# Patient Record
Sex: MALE | Race: WHITE | NOT HISPANIC OR LATINO | URBAN - METROPOLITAN AREA
[De-identification: names, ages, dates, MRNs, and addresses within clinical notes are randomized per-mention and may not be internally consistent; named-entity substitution may affect disease eponyms.]

---

## 2022-05-20 ENCOUNTER — WEB ENCOUNTER (OUTPATIENT)
Dept: URBAN - METROPOLITAN AREA CLINIC 107 | Facility: CLINIC | Age: 42
End: 2022-05-20

## 2022-05-20 ENCOUNTER — OFFICE VISIT (OUTPATIENT)
Dept: URBAN - METROPOLITAN AREA CLINIC 107 | Facility: CLINIC | Age: 42
End: 2022-05-20
Payer: SELF-PAY

## 2022-05-20 VITALS
HEIGHT: 73 IN | RESPIRATION RATE: 20 BRPM | DIASTOLIC BLOOD PRESSURE: 98 MMHG | WEIGHT: 301 LBS | HEART RATE: 75 BPM | TEMPERATURE: 98.6 F | SYSTOLIC BLOOD PRESSURE: 130 MMHG | BODY MASS INDEX: 39.89 KG/M2

## 2022-05-20 DIAGNOSIS — K62.5 BRIGHT RED BLOOD PER RECTUM: ICD-10-CM

## 2022-05-20 DIAGNOSIS — R74.8 ELEVATED LIVER ENZYMES: ICD-10-CM

## 2022-05-20 PROCEDURE — 99204 OFFICE O/P NEW MOD 45 MIN: CPT | Performed by: INTERNAL MEDICINE

## 2022-05-20 PROCEDURE — 99244 OFF/OP CNSLTJ NEW/EST MOD 40: CPT | Performed by: INTERNAL MEDICINE

## 2022-05-20 RX ORDER — DULOXETINE HYDROCHLORIDE 60 MG/1
1 CAPSULE CAPSULE, DELAYED RELEASE ORAL ONCE A DAY
Status: ACTIVE | COMMUNITY

## 2022-05-20 NOTE — HPI-TODAY'S VISIT:
42-year-old male referred by Dr. Tony Bui for evaluation of steatosis of the liver.  A copy of this document is being forwarded to the referring provider.  Recent labs with his PCP showed elevated liver enzymes, outlined below. He had an abdominal ultrasound about a week later at [facility] which reportedly showed fatty liver. He is going fairly well from a GI standpoint, aside from intermittent red blood per rectum which has been ongoing for some time. Every few months, he will experience bright red blood with a bowel movement which fills the toilet bowl. This is worsened with straining to have a bowel movement or following consumption of spicy foods. The bleeding may last for several days. He typically has 1-2 bowel movements daily. He denies abdominal pain, nausea or vomiting. He reports infrequent NSAID use and denies alcohol or tobacco use.  Labs 3/30/2022:Negative hepatitis A antibody IgM and total, hepatitis B surface antigen and antibody, hepatitis B E antigen and antibody, hepatitis B core antibody IgM and total, hepatitis C antibody.  Normal GGT, 23.  PT/INR 9.8/0.9.  Normal ferritin, 140.  AST 41, ALT 74, ALP 84, T bili 0.4.  CBC unremarkable with hemoglobin 15.4, platelets 242.

## 2022-05-31 PROBLEM — 405729008 BRIGHT RED BLOOD PER RECTUM: Status: ACTIVE | Noted: 2022-05-31

## 2022-07-12 ENCOUNTER — OFFICE VISIT (OUTPATIENT)
Dept: URBAN - METROPOLITAN AREA SURGERY CENTER 25 | Facility: SURGERY CENTER | Age: 42
End: 2022-07-12
Payer: SELF-PAY

## 2022-07-12 ENCOUNTER — TELEPHONE ENCOUNTER (OUTPATIENT)
Dept: URBAN - METROPOLITAN AREA CLINIC 113 | Facility: CLINIC | Age: 42
End: 2022-07-12

## 2022-07-12 DIAGNOSIS — K92.1 MELENA: ICD-10-CM

## 2022-07-12 DIAGNOSIS — D12.8 TUBULAR ADENOMA OF RECTUM: ICD-10-CM

## 2022-07-12 DIAGNOSIS — K64.8 HEMORRHOIDS, INTERNAL: ICD-10-CM

## 2022-07-12 DIAGNOSIS — D12.2 ADENOMA OF ASCENDING COLON: ICD-10-CM

## 2022-07-12 PROCEDURE — G8907 PT DOC NO EVENTS ON DISCHARG: HCPCS | Performed by: INTERNAL MEDICINE

## 2022-07-12 PROCEDURE — 45385 COLONOSCOPY W/LESION REMOVAL: CPT | Performed by: INTERNAL MEDICINE

## 2022-07-12 RX ORDER — DULOXETINE HYDROCHLORIDE 60 MG/1
1 CAPSULE CAPSULE, DELAYED RELEASE ORAL ONCE A DAY
Status: ACTIVE | COMMUNITY

## 2022-08-02 ENCOUNTER — OFFICE VISIT (OUTPATIENT)
Dept: URBAN - METROPOLITAN AREA CLINIC 107 | Facility: CLINIC | Age: 42
End: 2022-08-02
Payer: SELF-PAY

## 2022-08-02 VITALS
BODY MASS INDEX: 40.82 KG/M2 | DIASTOLIC BLOOD PRESSURE: 78 MMHG | HEART RATE: 66 BPM | WEIGHT: 308 LBS | TEMPERATURE: 98.5 F | SYSTOLIC BLOOD PRESSURE: 114 MMHG | HEIGHT: 73 IN

## 2022-08-02 DIAGNOSIS — K76.0 HEPATIC STEATOSIS: ICD-10-CM

## 2022-08-02 DIAGNOSIS — D36.9 TUBULAR ADENOMA: ICD-10-CM

## 2022-08-02 DIAGNOSIS — R74.8 ELEVATED LIVER ENZYMES: ICD-10-CM

## 2022-08-02 DIAGNOSIS — K64.8 INTERNAL HEMORRHOIDS WITH COMPLICATION: ICD-10-CM

## 2022-08-02 PROCEDURE — 99214 OFFICE O/P EST MOD 30 MIN: CPT | Performed by: NURSE PRACTITIONER

## 2022-08-02 RX ORDER — DULOXETINE HYDROCHLORIDE 60 MG/1
1 CAPSULE CAPSULE, DELAYED RELEASE ORAL ONCE A DAY
Status: ON HOLD | COMMUNITY

## 2022-08-02 NOTE — HPI-OTHER HISTORIES
Labs 3/30/2022:Negative hepatitis A antibody IgM and total, hepatitis B surface antigen and antibody, hepatitis B E antigen and antibody, hepatitis B core antibody IgM and total, hepatitis C antibody.  Normal GGT, 23.  PT/INR 9.8/0.9.  Normal ferritin, 140.  AST 41, ALT 74, ALP 84, T bili 0.4.  CBC unremarkable with hemoglobin 15.4, platelets 242.

## 2022-08-02 NOTE — HPI-TODAY'S VISIT:
42-year-old male presenting for follow-up after colonoscopy. He was seen in the office in May for evaluation of elevated liver enzymes, suspected to be related to nonalcoholic fatty liver disease.  Recent labs were negative for viral or hereditary cause of liver disease and ultrasound was reportedly unremarkable aside from hepatic steatosis.  Results were requested for review.  Labs were planned to trend his liver enzymes and exclude autoimmune etiology.  He was encouraged efforts at weight reduction with low-fat diet and exercise.  Regarding intermittent small-volume red blood per rectum, suspected to be hemorrhoidal in origin, a diagnostic colonoscopy was planned. Labs 7/27/2022:AST 40, ALT 70, ALP 77, T bili 0.3.  Ferritin 95.4.  Negative hepatitis A antibody total, hepatitis B surface antibody, hepatitis B core antibody total, hepatitis C antibody.  Normal GGT, 24.  Negative AMA, ASMA. Colonoscopy 7/12/2022:A 5 mm tubular adenoma in the rectum, a 4 mm tubular adenoma in the ascending colon, moderate external and internal hemorrhoids.  Repeat colonoscopy recommended in 5 years for surveillance. RUQ ultrasound with elastography 4/13/2022 showed hepatic steatosis with normal shear wave velocity of 1.24 m/s. He continues to experience occasional small-volume red blood per rectum with wiping and burning rectal discomfort, which responds to Preparation H when needed.  He has a daily bowel movement.  No abdominal pain, nausea or vomiting.

## 2022-11-02 ENCOUNTER — OFFICE VISIT (OUTPATIENT)
Dept: URBAN - METROPOLITAN AREA CLINIC 107 | Facility: CLINIC | Age: 42
End: 2022-11-02

## 2022-12-07 ENCOUNTER — OFFICE VISIT (OUTPATIENT)
Dept: URBAN - METROPOLITAN AREA CLINIC 107 | Facility: CLINIC | Age: 42
End: 2022-12-07

## 2022-12-07 RX ORDER — DULOXETINE HYDROCHLORIDE 60 MG/1
1 CAPSULE CAPSULE, DELAYED RELEASE ORAL ONCE A DAY
Status: ON HOLD | COMMUNITY

## 2023-01-04 ENCOUNTER — OFFICE VISIT (OUTPATIENT)
Dept: URBAN - METROPOLITAN AREA CLINIC 107 | Facility: CLINIC | Age: 43
End: 2023-01-04
Payer: SELF-PAY

## 2023-01-04 ENCOUNTER — DASHBOARD ENCOUNTERS (OUTPATIENT)
Age: 43
End: 2023-01-04

## 2023-01-04 VITALS
BODY MASS INDEX: 38.04 KG/M2 | DIASTOLIC BLOOD PRESSURE: 80 MMHG | RESPIRATION RATE: 18 BRPM | TEMPERATURE: 97.2 F | WEIGHT: 287 LBS | SYSTOLIC BLOOD PRESSURE: 112 MMHG | HEIGHT: 73 IN | HEART RATE: 74 BPM

## 2023-01-04 DIAGNOSIS — K76.0 HEPATIC STEATOSIS: ICD-10-CM

## 2023-01-04 DIAGNOSIS — R74.8 ELEVATED LIVER ENZYMES: ICD-10-CM

## 2023-01-04 PROBLEM — 197321007 STEATOSIS OF LIVER: Status: ACTIVE | Noted: 2022-08-02

## 2023-01-04 PROCEDURE — 99213 OFFICE O/P EST LOW 20 MIN: CPT | Performed by: NURSE PRACTITIONER

## 2023-01-04 NOTE — HPI-OTHER HISTORIES
Labs 7/27/2022:AST 40, ALT 70, ALP 77, T bili 0.3.  Ferritin 95.4.  Negative hepatitis A antibody total, hepatitis B surface antibody, hepatitis B core antibody total, hepatitis C antibody.  Normal GGT, 24.  Negative AMA, ASMA. Colonoscopy 7/12/2022:A 5 mm tubular adenoma in the rectum, a 4 mm tubular adenoma in the ascending colon, moderate external and internal hemorrhoids.  Repeat colonoscopy recommended in 5 years for surveillance. RUQ ultrasound with elastography 4/13/2022 showed hepatic steatosis with normal shear wave velocity of 1.24 m/s. Labs 3/30/2022:Negative hepatitis A antibody IgM and total, hepatitis B surface antigen and antibody, hepatitis B E antigen and antibody, hepatitis B core antibody IgM and total, hepatitis C antibody.  Normal GGT, 23.  PT/INR 9.8/0.9.  Normal ferritin, 140.  AST 41, ALT 74, ALP 84, T bili 0.4.  CBC unremarkable with hemoglobin 15.4, platelets 242.

## 2023-01-04 NOTE — HPI-TODAY'S VISIT:
42-year-old male presenting for follow-up of elevated liver enzymes and fatty liver. He was seen in the office in August for follow-up of elevated liver enzymes related to NAFLD. Additional work-up was negative for secondary cause of liver disease, and US with elastography was negative for fibrosis. He was encouraged efforts at weight reduction with low-fat diet and exercise. Regarding intermittent small volume red blood per rectum and perianal discomfort secondary to internal hemorrhoids, his symptoms responded to preparation H when needed; he declined hemorrhoidal banding. Recent colonoscopy, performed for evaluation of these complaints, was notable for the removal of two tubular adenomas; repeat colonoscopy was recommended in 5 years for surveillance. He is doing well from a GI standpoint. He has not experienced further hemorrhoid symptoms and has not required preparation H. He denies abdominal pain, nausea or vomiting. His bowel habits are regular. He has lost 20lb since the time of his last visit with dietary modification. He has labs in the near future with his PCP in preparation for his annual physical.